# Patient Record
Sex: MALE | Race: WHITE
[De-identification: names, ages, dates, MRNs, and addresses within clinical notes are randomized per-mention and may not be internally consistent; named-entity substitution may affect disease eponyms.]

---

## 2018-04-16 ENCOUNTER — HOSPITAL ENCOUNTER (EMERGENCY)
Dept: HOSPITAL 17 - NEPE | Age: 68
Discharge: HOME | End: 2018-04-16
Payer: OTHER GOVERNMENT

## 2018-04-16 VITALS — WEIGHT: 196.21 LBS | HEIGHT: 65 IN | BODY MASS INDEX: 32.69 KG/M2

## 2018-04-16 VITALS — DIASTOLIC BLOOD PRESSURE: 77 MMHG | TEMPERATURE: 97.9 F | SYSTOLIC BLOOD PRESSURE: 133 MMHG

## 2018-04-16 VITALS
HEART RATE: 83 BPM | RESPIRATION RATE: 20 BRPM | TEMPERATURE: 98.1 F | SYSTOLIC BLOOD PRESSURE: 146 MMHG | OXYGEN SATURATION: 98 % | DIASTOLIC BLOOD PRESSURE: 82 MMHG

## 2018-04-16 VITALS
RESPIRATION RATE: 20 BRPM | DIASTOLIC BLOOD PRESSURE: 85 MMHG | OXYGEN SATURATION: 98 % | HEART RATE: 100 BPM | SYSTOLIC BLOOD PRESSURE: 124 MMHG

## 2018-04-16 VITALS
HEART RATE: 95 BPM | SYSTOLIC BLOOD PRESSURE: 150 MMHG | TEMPERATURE: 97.9 F | OXYGEN SATURATION: 97 % | DIASTOLIC BLOOD PRESSURE: 73 MMHG | RESPIRATION RATE: 18 BRPM

## 2018-04-16 VITALS — OXYGEN SATURATION: 97 %

## 2018-04-16 DIAGNOSIS — E11.9: ICD-10-CM

## 2018-04-16 DIAGNOSIS — I10: ICD-10-CM

## 2018-04-16 DIAGNOSIS — G93.40: ICD-10-CM

## 2018-04-16 DIAGNOSIS — J45.909: ICD-10-CM

## 2018-04-16 DIAGNOSIS — Z66: ICD-10-CM

## 2018-04-16 DIAGNOSIS — Z46.82: ICD-10-CM

## 2018-04-16 DIAGNOSIS — J95.09: Primary | ICD-10-CM

## 2018-04-16 PROCEDURE — 71045 X-RAY EXAM CHEST 1 VIEW: CPT

## 2018-04-16 PROCEDURE — 99284 EMERGENCY DEPT VISIT MOD MDM: CPT

## 2018-04-16 PROCEDURE — 94664 DEMO&/EVAL PT USE INHALER: CPT

## 2018-04-16 NOTE — PD
HPI


Chief Complaint:  Respiratory Symptoms


Time Seen by Provider:  09:16


Travel History


International Travel<30 days:  No


Contact w/Intl Traveler<30days:  No


Traveled to known affect area:  No





History of Present Illness


HPI


68-year-old male came to the emergency room with history of trach being 

dislodged and some bloody secretions from the trach.  Patient is a DNR and 

trach dependent.  He lives in a nursing home and the nursing home staff noticed 

that he was having some bloody secretions coming out.  EMS was called but when 

EMS arrived they noticed that the trach was partially dislodged.  Patient is 

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  He was maintaining his oxygen 

saturation and vitals well.  Patient of course is in no condition to give any 

meaningful history.  History was obtained from the paramedics.





[facility]


Past Medical History


*** Narrative Medical


List of his past medical, surgical, social and family history is reviewed from 

the nursing note.


Hx Anticoagulant Therapy:  Yes (ASA)


Asthma:  Yes


Anxiety:  Yes


Depression:  Yes


Cardiovascular Problems:  Yes


Chest Pain:  Yes


Diabetes:  Yes


Gastrointestinal Disorders:  Yes (FEEDING TUBE )


Hypertension:  Yes


Seizures:  Yes


Thyroid Disease:  Yes (HYPO)





Past Surgical History


Other Surgery:  Yes (TRACH)





Social History


Alcohol Use:  Yes (OCCASSIONALLY.)


Tobacco Use:  No (TWO PACKS PER DAY.)


Substance Use:  No





Allergies-Medications


(Allergen,Severity, Reaction):  


Coded Allergies:  


     No Known Allergies (Verified  Allergy, 3/2/18)


Comments


No known drug allergies


Reported Meds & Prescriptions





Reported Meds & Active Scripts


Active


Reported


Isosource 1.5 Roly Liquid (Lactose-Reduced Food/Fiber) 250 Ml Liquid 50 Ml PEG 

Q1HR 


Sterile Water Irrigation (Water For Irrigation, Sterile) 1 Sol Sol 30 Ml PEG 

BEFORE & AFTER MEDS


Sterile Water Irrigation (Water For Irrigation, Sterile) 1 Sol Sol 220 Ml PEG 

EACH SHIFT


Colace (Docusate Sodium) 100 Mg Capsule 100 Mg PEG Q12HR PRN


Questran (Cholestyramine) 4 Gm/Pkt Powd 4 Gm PEG Q8HR PRN


     1 packet contains 4gm of cholestyramine.


Zofran (Ondansetron HCl) 4 Mg Tab 4 Mg PEG Q8HR PRN


Mapap (Acetaminophen) 325 Mg Tab 650 Mg PEG Q6HR PRN


Atropine Opth Drops 1% Soln 2 Drop PO Q4H PRN


Duoneb (Ipratropium-Albuterol Neb) 0.5-2.5 Mg/3 Ml Neb 1 Nebule INH Q4HR NEB PRN


Reglan (Metoclopramide HCl) 10 Mg Tab 10 Mg PEG QID


Keppra Liq (Levetiracetam) 500 Mg/5 Ml Soln 1,000 Mg PEG BID


Budesonide Neb 0.5 Mg/2 Ml Neb 0.5 Mg NEB BID NEB


Ferrous Sulfate 220 Mg/5 Ml Elx 330 Mg PEG TID


Zyrtec (Cetirizine HCl) 10 Mg Tablet 10 Mg PEG HS


Digoxin Liq (Digoxin) 0.05 Mg/Ml Soln 0.25 Mg PEG DAILY


Folic Acid 1 Mg Tablet 1 Mg PEG DAILY


Amiodarone (Amiodarone HCl) 100 Mg Tab 100 Mg PEG DAILY


Magnesium Oxide 400 Mg Tab 400 Mg PEG DAILY


Synthroid (Levothyroxine Sodium) 100 Mcg Tab 100 Mcg PEG DAILY


Synthroid (Levothyroxine Sodium) 175 Mcg Tab 175 Mcg PEG DAILY


Aspirin 81 Mg Chew 81 Mg PEG DAILY





Narrative Medication


List of his home medications reviewed from the nursing note.





Review of Systems


Except as stated in HPI:  all other systems reviewed are Neg





Physical Exam


Narrative


GENERAL: Vegetative encephalopathy.


SKIN: Focused skin assessment warm/dry.


HEAD: Atraumatic. Normocephalic. 


EYES: Pupils equal and round. No scleral icterus. No injection or drainage. 


ENT: No nasal bleeding or discharge.  Mucous membranes pink and moist. 


NECK: Trachea midline. No JVD.  Tracheostomy partially dislodged


CARDIOVASCULAR: Regular rate and rhythm.  No murmur appreciated.


RESPIRATORY: No accessory muscle use. Clear to auscultation. Breath sounds 

equal bilaterally. 


GASTROINTESTINAL: Abdomen soft, non-tender, nondistended. Hepatic and splenic 

margins not palpable. 


MUSCULOSKELETAL: No obvious deformities. No clubbing.  No cyanosis.  No edema. 


NEUROLOGICAL: Vegetative encephalopathy


PSYCHIATRIC: Unable to assess





Data


Data


Last Documented VS





Vital Signs








  Date Time  Temp Pulse Resp B/P (MAP) Pulse Ox O2 Delivery O2 Flow Rate FiO2


 


4/16/18 13:21 97.9 98 16 133/77 (95) 98   


 


4/16/18 10:07      Trach Collar 6.00 28








Orders





 Orders


Chest, Single Ap (4/16/18 )


Albuterol Neb (Albuterol Neb) (4/16/18 09:30)


Ed Discharge Order (4/16/18 10:55)








[facility]


Medical Decision Making


Medical Screen Exam Complete:  Yes


Emergency Medical Condition:  Yes


Medical Record Reviewed:  Yes


Differential Diagnosis


Partially dislodged trach


Narrative Course


11 AM patient had an 8.0 tracheostomy tube which I was unable to put back in.  

I went to a size down to 6.0 which went in easily.  Please refer to my 

procedure note.  Patient was given a breathing treatment and the trach tube was 

suctioned.  Oxygen saturation is doing well.  At this point I am comfortable 

discharging him home.  The chest x-ray looks good.





Procedures


**Procedure Narrative**


Tracheostomy reinsertion: The old trach tube was taken out which was partially 

dislodged.  6.0 tracheostomy tube was reinserted initially with an obturator 

and then the inner cannula was applied.  Chest x-ray was ordered to confirm.  

Patient tolerated the procedure well.  The tube was suctioned after the 

insertion.


EKG Prior to Arrival:  No





Diagnosis





 Primary Impression:  


 Tracheostomy mechanical complication


Referrals:  


Primary Care Physician


2 days





***Additional Instructions:  


Return to the ER if the condition worsens or any other new concerns.  Otherwise 

follow-up with your primary care.


***Med/Other Pt SpecificInfo:  No Meds Exist/No RX given


Disposition:  01 DISCHARGE HOME


Condition:  Stable











Rachel Calhoun MD Apr 16, 2018 11:03

## 2018-04-16 NOTE — RADRPT
EXAM DATE/TIME:  04/16/2018 09:18 

 

[facility] COMPARISON:     

CHEST SINGLE AP, October 11, 2013, 1:45.

 

                     

INDICATIONS :     

Reinsertion of trach.

                     

 

MEDICAL HISTORY :     

Hypertension.       Diabetes.   

 

SURGICAL HISTORY :        

Trach placement.

 

ENCOUNTER:     

Initial                                        

 

ACUITY:     

1 day      

 

PAIN SCORE:     

Non-responsive.

 

LOCATION:     

Bilateral chest 

 

FINDINGS:     

A single view of the chest demonstrates left basilar subsegmental atelectasis. Tracheostomy tube inte
rposition with tip approximately 5 cm above the kathleen. PICC line removed.  The cardiomediastinal con
tours are unremarkable.  Osseous structures are intact.

 

CONCLUSION:     

Left basilar subsegmental atelectasis.

 

 

 

 Aung Kruger MD on April 16, 2018 at 9:43           

Board Certified Radiologist.

 This report was verified electronically.